# Patient Record
Sex: FEMALE | Race: WHITE | URBAN - METROPOLITAN AREA
[De-identification: names, ages, dates, MRNs, and addresses within clinical notes are randomized per-mention and may not be internally consistent; named-entity substitution may affect disease eponyms.]

---

## 2022-07-28 ENCOUNTER — OFFICE VISIT (OUTPATIENT)
Dept: URBAN - METROPOLITAN AREA CLINIC 72 | Facility: CLINIC | Age: 82
End: 2022-07-28
Payer: COMMERCIAL

## 2022-07-28 DIAGNOSIS — H25.21 AGE-RELATED HYPERMATURE CATARACT OF RIGHT EYE: ICD-10-CM

## 2022-07-28 DIAGNOSIS — H25.812 COMBINED FORMS OF AGE-RELATED CATARACT, LEFT EYE: ICD-10-CM

## 2022-07-28 DIAGNOSIS — H40.053 OCULAR HYPERTENSION, BILATERAL: Primary | ICD-10-CM

## 2022-07-28 PROCEDURE — 76514 ECHO EXAM OF EYE THICKNESS: CPT | Performed by: OPTOMETRIST

## 2022-07-28 PROCEDURE — 92133 CPTRZD OPH DX IMG PST SGM ON: CPT | Performed by: OPTOMETRIST

## 2022-07-28 PROCEDURE — 99204 OFFICE O/P NEW MOD 45 MIN: CPT | Performed by: OPTOMETRIST

## 2022-07-28 ASSESSMENT — VISUAL ACUITY
OD: HM
OS: 20/80

## 2022-07-28 ASSESSMENT — INTRAOCULAR PRESSURE
OD: 30
OS: 25

## 2022-07-28 NOTE — IMPRESSION/PLAN
Impression: Ocular hypertension, bilateral: H40.053. IOP 30 25  thick OS unable OD 
OCT ordered and done today 93 WNL OS Plan: Discussed DX and testing in detail with pt 
IOP is hypertensive, but ON is healthy and OCT is WNL. Do not recommend gtt therapy at this time. Will reevaluate after pt has cataract SX> Pt understands

## 2022-07-28 NOTE — IMPRESSION/PLAN
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 Plan: Discussed recommendation for cataract extraction w/ IOL due to current BCVA and glare. Pt's symptoms are affecting daily life at distance and near. Discussed options for surgery. Recommend OD first, then OS. Patient elects to move forward with surgery. Full discussion R, B, A. Discussed IOL options. All questions answered. Patient wants surgery. RL 2. Schedule for preop.  

Recommend Standard IOL/Toric IOL/Multifocal IOL - confirm with surgeon

## 2022-09-15 ENCOUNTER — PRE-OPERATIVE VISIT (OUTPATIENT)
Dept: URBAN - METROPOLITAN AREA CLINIC 71 | Facility: CLINIC | Age: 82
End: 2022-09-15
Payer: COMMERCIAL

## 2022-09-15 DIAGNOSIS — H25.813 COMBINED FORMS OF AGE-RELATED CATARACT, BILATERAL: ICD-10-CM

## 2022-09-15 DIAGNOSIS — H25.812 COMBINED FORMS OF AGE-RELATED CATARACT, LEFT EYE: Primary | ICD-10-CM

## 2022-09-29 ENCOUNTER — PRE-OPERATIVE VISIT (OUTPATIENT)
Dept: URBAN - METROPOLITAN AREA CLINIC 71 | Facility: CLINIC | Age: 82
End: 2022-09-29
Payer: COMMERCIAL

## 2022-09-29 DIAGNOSIS — H40.053 OCULAR HYPERTENSION, BILATERAL: Primary | ICD-10-CM

## 2022-09-29 DIAGNOSIS — H25.813 COMBINED FORMS OF AGE-RELATED CATARACT, BILATERAL: ICD-10-CM

## 2022-09-29 PROCEDURE — 99213 OFFICE O/P EST LOW 20 MIN: CPT | Performed by: OPHTHALMOLOGY

## 2022-09-29 RX ORDER — KETOROLAC TROMETHAMINE 5 MG/ML
0.5 % SOLUTION OPHTHALMIC
Qty: 5 | Refills: 0 | Status: ACTIVE
Start: 2022-09-29

## 2022-09-29 ASSESSMENT — INTRAOCULAR PRESSURE
OD: 30
OS: 27

## 2022-09-29 NOTE — IMPRESSION/PLAN
Impression: Ocular hypertension, bilateral: H40.053. Plan: Optos ordered- will continue to monitor patient off of drops at this time. Will have them reevaluated after surgery.

## 2022-09-29 NOTE — IMPRESSION/PLAN
Impression: Combined forms of age-related cataract, bilateral: H25.813. Plan: Heart and lungs clear. R/B/A discussed. Proceed with [facility] with ORA cataract surgery with dexycu. OD first for distance then OS for distance. Patient voices understanding that cataract surgery is not a guarantee for 20/20 vision and that glasses may be needed after the surgery. No clearance needed per Dr. Daina Mcintosh. Start ketorolac BID for 3 weeks starting the day prior to surgery on the surgical eye. Proceed with surgery.

## 2022-10-11 ENCOUNTER — SURGERY (OUTPATIENT)
Dept: URBAN - METROPOLITAN AREA SURGERY 45 | Facility: SURGERY | Age: 82
End: 2022-10-11
Payer: COMMERCIAL

## 2022-10-11 ENCOUNTER — SURGERY (OUTPATIENT)
Dept: URBAN - METROPOLITAN AREA SURGERY 44 | Facility: SURGERY | Age: 82
End: 2022-10-11
Payer: COMMERCIAL

## 2022-10-11 PROCEDURE — 66850 REMOVAL OF LENS MATERIAL: CPT | Performed by: OPHTHALMOLOGY

## 2022-10-11 PROCEDURE — CRBAL CREDIT BALANCE: CUSTOM | Performed by: OPHTHALMOLOGY

## 2022-10-12 ENCOUNTER — POST-OPERATIVE VISIT (OUTPATIENT)
Dept: URBAN - METROPOLITAN AREA CLINIC 71 | Facility: CLINIC | Age: 82
End: 2022-10-12
Payer: COMMERCIAL

## 2022-10-12 DIAGNOSIS — Z48.810 ENCOUNTER FOR SURGICAL AFTERCARE FOLLOWING SURGERY ON A SENSE ORGAN: Primary | ICD-10-CM

## 2022-10-12 PROCEDURE — 99024 POSTOP FOLLOW-UP VISIT: CPT | Performed by: OPHTHALMOLOGY

## 2022-10-12 ASSESSMENT — INTRAOCULAR PRESSURE
OD: 40
OS: 30
OD: 43

## 2022-10-12 NOTE — IMPRESSION/PLAN
Impression: S/P Complex Cataract Extraction by phacoemulsification with IOL placement OD - 1 Day. Encounter for surgical aftercare following surgery on a sense organ  Z48.810. Excellent post op course   Post operative instructions reviewed - Plan: Continue the rx eye drops as scheduled. Patient needs to  the pred. and start taking it. IOP 43 OD, will add some lumigan, for her to take at bed time in the right eye for now.

## 2022-10-17 ENCOUNTER — POST-OPERATIVE VISIT (OUTPATIENT)
Dept: URBAN - METROPOLITAN AREA CLINIC 71 | Facility: CLINIC | Age: 82
End: 2022-10-17
Payer: COMMERCIAL

## 2022-10-17 DIAGNOSIS — Z48.810 ENCOUNTER FOR SURGICAL AFTERCARE FOLLOWING SURGERY ON A SENSE ORGAN: Primary | ICD-10-CM

## 2022-10-17 PROCEDURE — 99024 POSTOP FOLLOW-UP VISIT: CPT | Performed by: OPHTHALMOLOGY

## 2022-10-17 ASSESSMENT — INTRAOCULAR PRESSURE
OS: 27
OD: 36

## 2022-10-17 NOTE — IMPRESSION/PLAN
Impression: S/P Complex Cataract Extraction by phacoemulsification with IOL placement OD - 6 Days. Encounter for surgical aftercare following surgery on a sense organ  Z48.810. Plan: Continue brimonidine BID OU, ketorolac BID OD, timolol BID OU, pred TID OD. Will add another drop of vyzulta for the patient to take at bed time. Patient to return in 1 week, explained that we need to see her once a week until her IOP comes down. Gave a list to patient for instructions or drops.

## 2022-10-26 ENCOUNTER — POST-OPERATIVE VISIT (OUTPATIENT)
Dept: URBAN - METROPOLITAN AREA CLINIC 71 | Facility: CLINIC | Age: 82
End: 2022-10-26
Payer: COMMERCIAL

## 2022-10-26 DIAGNOSIS — Z48.810 ENCOUNTER FOR SURGICAL AFTERCARE FOLLOWING SURGERY ON A SENSE ORGAN: Primary | ICD-10-CM

## 2022-10-26 PROCEDURE — 99024 POSTOP FOLLOW-UP VISIT: CPT | Performed by: OPHTHALMOLOGY

## 2022-10-26 ASSESSMENT — INTRAOCULAR PRESSURE
OS: 22
OD: 25

## 2022-10-26 NOTE — IMPRESSION/PLAN
Impression: S/P Complex Cataract Extraction by phacoemulsification with IOL placement OD - 15 Days. Encounter for surgical aftercare following surgery on a sense organ  Z48.810. Plan: With a +10 sph she was up to 20/60. Patient doesn't seem compliant on drops, she had stopped taking the Vyzulta. Discussed options, from glasses to CL which would be thick or a sutured implant in OD. Recommend since the capsule strong enough using the December appointment to put another implant in the right eye instead of the left. Decrease the pred BID OD, brimonidine BID OU, timolol OU, ketorolac BID OD.  Patient to return 4 weeks to r/c

## 2022-11-29 ENCOUNTER — POST-OPERATIVE VISIT (OUTPATIENT)
Dept: URBAN - METROPOLITAN AREA CLINIC 71 | Facility: CLINIC | Age: 82
End: 2022-11-29
Payer: COMMERCIAL

## 2022-11-29 DIAGNOSIS — Z48.810 ENCOUNTER FOR SURGICAL AFTERCARE FOLLOWING SURGERY ON A SENSE ORGAN: Primary | ICD-10-CM

## 2022-11-29 PROCEDURE — 99024 POSTOP FOLLOW-UP VISIT: CPT | Performed by: OPHTHALMOLOGY

## 2022-11-29 ASSESSMENT — INTRAOCULAR PRESSURE
OD: 35
OS: 28

## 2022-11-29 ASSESSMENT — KERATOMETRY
OS: 44.38
OD: 44.00

## 2022-11-29 NOTE — IMPRESSION/PLAN
Impression: S/P Complex Cataract Extraction by phacoemulsification with IOL placement OD - 49 Days. Encounter for surgical aftercare following surgery on a sense organ  Z48.810. Post operative instructions reviewed - Plan: Continue ketorolac BID OD for 3 weeks. Patients IOP is elevated OU. Glaucoma- patient IOP is uncontrolled mainly OD, unfortunately she is intolerant to sulfa, beta blockers or pro Will try rhopressa but seems surgical treatment of the glaucoma seems to be the key. PCIOL: There seems to be no zonules nasally , suspect a PCO sutured implant will be the best option. Will refer to Dr Dilia Marsh for a second opinion. Start Rhopressa QHS OD, continue brimonidine BID OD.

## 2022-12-12 ENCOUNTER — POST-OPERATIVE VISIT (OUTPATIENT)
Dept: URBAN - METROPOLITAN AREA CLINIC 71 | Facility: CLINIC | Age: 82
End: 2022-12-12
Payer: COMMERCIAL

## 2022-12-12 DIAGNOSIS — Z48.810 ENCOUNTER FOR SURGICAL AFTERCARE FOLLOWING SURGERY ON A SENSE ORGAN: Primary | ICD-10-CM

## 2022-12-12 PROCEDURE — 99024 POSTOP FOLLOW-UP VISIT: CPT | Performed by: OPHTHALMOLOGY

## 2022-12-12 RX ORDER — LATANOPROSTENE BUNOD 0.24 MG/ML
0.024 % SOLUTION/ DROPS OPHTHALMIC
Qty: 5 | Refills: 0 | Status: ACTIVE
Start: 2022-12-12

## 2022-12-12 RX ORDER — BRIMONIDINE TARTRATE 2 MG/ML
0.2 % SOLUTION/ DROPS OPHTHALMIC
Qty: 5 | Refills: 0 | Status: INACTIVE
Start: 2022-12-12 | End: 2022-12-13

## 2022-12-12 ASSESSMENT — INTRAOCULAR PRESSURE
OS: 19
OD: 19

## 2022-12-12 NOTE — IMPRESSION/PLAN
Impression: S/P Complex Cataract Extraction by phacoemulsification with IOL placement OD - 62 Days. Encounter for surgical aftercare following surgery on a sense organ  Z48.810. Plan: Healing well. IOP stable, patient had not started the rhopressa. Continue brimonidine BID OU, Vyzulta QHS OU. Patient to return n/a with Dr Anil Schrader for a second opinion for a suture IOL to make sure it can be more stable, then see if he can do the suture IOL for the left eye. Will hold off on the 20th until Dr Anil Schrader.

## 2022-12-22 ENCOUNTER — OFFICE VISIT (OUTPATIENT)
Facility: LOCATION | Age: 82
End: 2022-12-22
Payer: COMMERCIAL

## 2022-12-22 DIAGNOSIS — H27.01 APHAKIA, RIGHT EYE: Primary | ICD-10-CM

## 2022-12-22 DIAGNOSIS — H25.12 AGE-RELATED NUCLEAR CATARACT, LEFT EYE: ICD-10-CM

## 2022-12-22 DIAGNOSIS — H35.3133 NONEXUDATIVE AGE-RELATED MACULAR DEGENERATION, BILATERAL, ADVANCED ATROPHIC WITHOUT SUBFOVEAL INVOLVEMENT: ICD-10-CM

## 2022-12-22 DIAGNOSIS — H43.811 VITREOUS DEGENERATION, RIGHT EYE: ICD-10-CM

## 2022-12-22 PROCEDURE — 99203 OFFICE O/P NEW LOW 30 MIN: CPT | Performed by: OPHTHALMOLOGY

## 2022-12-22 PROCEDURE — 92134 CPTRZ OPH DX IMG PST SGM RTA: CPT | Performed by: OPHTHALMOLOGY

## 2022-12-22 ASSESSMENT — INTRAOCULAR PRESSURE
OS: 21
OD: 21

## 2022-12-22 NOTE — IMPRESSION/PLAN
Impression: Aphakia, right eye: H27.01. Plan: We discussed the findings of the patient's aphakia. RBAC's of contact lens vs ACIOL vs posterior fixated lens d/w patient. Surgical treatment is recommended. Surgical risks, benefits, indications and alternatives were discussed. These risks include, but are not limited to, infection, retinal tear/detachment, dislocation of the secondary lens, glaucoma, pain, loss of eye and blindness, hemorrhage, ptosis, and the need for re-operation. The patient elects to proceed with vitrectomy surgery/sutured lens vs ACIOL 25g PPV/sutured IOL vs ACIOL OD

## 2022-12-22 NOTE — IMPRESSION/PLAN
Impression: Nonexudative age-related macular degeneration, bilateral, advanced atrophic without subfoveal involvement: H35.3133. OCT OU - DS OU  Plan: Will limit VA outcome with surgery Discussed diagnosis with patient. No smoking. Pt to take vitamins approved in the AREDS2 study & continue to monitor AG on a daily basis. If any changes w/ AG call and RTC ASAP. May benefit from treatment of atrophy with future agents, once FDA-approved and available. D/w patient.

## 2023-03-09 ENCOUNTER — POST-OPERATIVE VISIT (OUTPATIENT)
Facility: LOCATION | Age: 83
End: 2023-03-09
Payer: COMMERCIAL

## 2023-03-09 DIAGNOSIS — H27.01 APHAKIA, RIGHT EYE: Primary | ICD-10-CM

## 2023-03-09 PROCEDURE — 99024 POSTOP FOLLOW-UP VISIT: CPT | Performed by: OPHTHALMOLOGY

## 2023-03-09 ASSESSMENT — INTRAOCULAR PRESSURE
OD: 20
OS: 24

## 2023-03-09 NOTE — IMPRESSION/PLAN
Impression: S/P 25g PPV/sutured IOL vs ACIOL OD - 1 Day. Aphakia, right eye  H27.01. Plan: No s/s of RD/infection VA/IOP acceptable Sutured IOL centered and stable Post-operative instructions and precautions Reviewed. 
Call ASAP with changes
--Continue Ocuflox QID--Continue PF QID

1 week POS

## 2023-03-21 ENCOUNTER — POST-OPERATIVE VISIT (OUTPATIENT)
Facility: LOCATION | Age: 83
End: 2023-03-21
Payer: COMMERCIAL

## 2023-03-21 DIAGNOSIS — H27.01 APHAKIA, RIGHT EYE: Primary | ICD-10-CM

## 2023-03-21 PROCEDURE — 99024 POSTOP FOLLOW-UP VISIT: CPT | Performed by: OPHTHALMOLOGY

## 2023-03-21 RX ORDER — BIMATOPROST 0.3 MG/ML
0.03 % SOLUTION/ DROPS OPHTHALMIC
Qty: 5 | Refills: 3 | Status: INACTIVE
Start: 2023-03-21 | End: 2023-04-19

## 2023-03-21 ASSESSMENT — INTRAOCULAR PRESSURE
OD: 44
OS: 35

## 2023-03-21 NOTE — IMPRESSION/PLAN
Impression: S/P 25g PPV/sutured IOL OD - 13 Days. Aphakia, right eye  H27.01. Plan: No s/s of RD/infection VA  acceptable Sutured IOL well centered and stable IOP elevated - has had episodes of bradycardia on her IOP meds that she uses in both eyes - she stopped using all drops except for brimonidine - not taking ABX or PF either D/w patient that if her IOP remains elevated OU that she is at risk of permanently losing vision in both eyes Rec resume IOP drops, but avoid beta-blockers D/w patient at length - rec start lumigan OU QHS Post-operative instructions and precautions Reviewed. Call ASAP with changes 2 week IOP check with Dr. Molly Joseph 1 month POS/OCT

## 2023-03-28 ENCOUNTER — POST-OPERATIVE VISIT (OUTPATIENT)
Dept: URBAN - METROPOLITAN AREA CLINIC 71 | Facility: CLINIC | Age: 83
End: 2023-03-28
Payer: COMMERCIAL

## 2023-03-28 DIAGNOSIS — Z48.810 ENCOUNTER FOR SURGICAL AFTERCARE FOLLOWING SURGERY ON A SENSE ORGAN: Primary | ICD-10-CM

## 2023-03-28 PROCEDURE — 99024 POSTOP FOLLOW-UP VISIT: CPT | Performed by: OPTOMETRIST

## 2023-03-28 RX ORDER — BIMATOPROST 0.1 MG/ML
0.01 % SOLUTION/ DROPS OPHTHALMIC
Qty: 1 | Refills: 3 | Status: ACTIVE
Start: 2023-03-28

## 2023-03-28 RX ORDER — BIMATOPROST 0.1 MG/ML
0.01 % SOLUTION/ DROPS OPHTHALMIC
Qty: 1 | Refills: 3 | Status: INACTIVE
Start: 2023-03-28 | End: 2023-03-28

## 2023-03-28 ASSESSMENT — INTRAOCULAR PRESSURE
OS: 24
OD: 31

## 2023-03-28 NOTE — IMPRESSION/PLAN
Impression: S/P 25g PPV/sutured IOL vs ACIOL OD - 20 Days. Encounter for surgical aftercare following surgery on a sense organ  Z48.810. PAtient had not started Lumigan yet. Was given a sample bottle in office today and told to take it once at night before bed OU. Continue taking Brimonidine TID OU Pressure still high at 31/24 Plan: IOP/drop check in two weeks

## 2023-04-12 ENCOUNTER — OFFICE VISIT (OUTPATIENT)
Dept: URBAN - METROPOLITAN AREA CLINIC 71 | Facility: CLINIC | Age: 83
End: 2023-04-12
Payer: COMMERCIAL

## 2023-04-12 DIAGNOSIS — Z48.810 ENCOUNTER FOR SURGICAL AFTERCARE FOLLOWING SURGERY ON A SENSE ORGAN: Primary | ICD-10-CM

## 2023-04-12 PROCEDURE — 99212 OFFICE O/P EST SF 10 MIN: CPT | Performed by: OPTOMETRIST

## 2023-04-12 ASSESSMENT — INTRAOCULAR PRESSURE
OD: 21
OS: 19

## 2023-04-27 ENCOUNTER — POST-OPERATIVE VISIT (OUTPATIENT)
Facility: LOCATION | Age: 83
End: 2023-04-27
Payer: COMMERCIAL

## 2023-04-27 DIAGNOSIS — H27.01 APHAKIA, RIGHT EYE: Primary | ICD-10-CM

## 2023-04-27 PROCEDURE — 99024 POSTOP FOLLOW-UP VISIT: CPT | Performed by: OPHTHALMOLOGY

## 2023-04-27 ASSESSMENT — INTRAOCULAR PRESSURE
OS: 28
OD: 27

## 2023-04-27 NOTE — IMPRESSION/PLAN
Impression: S/P 25g PPV/sutured IOL vs ACIOL OD - 50 Days. Aphakia, right eye  H27.01.
OCT OU - no IRF/SRF OU  / 220  Plan: No s/s of RD/infection VA/IOP acceptable Sutured IOL stable and centered Post-operative instructions and precautions Reviewed. Taper PF TID x 1 week, BID x 1 week, daily x 1 week and CSM with daily Call ASAP with changes f/u with Dr. Rahul Renae next week as scheduled

prn RCA

## 2023-05-01 ENCOUNTER — OFFICE VISIT (OUTPATIENT)
Dept: URBAN - METROPOLITAN AREA CLINIC 71 | Facility: CLINIC | Age: 83
End: 2023-05-01
Payer: COMMERCIAL

## 2023-05-01 DIAGNOSIS — H35.3133 NONEXUDATIVE AGE-RELATED MACULAR DEGENERATION, BILATERAL, ADVANCED ATROPHIC WITHOUT SUBFOVEAL INVOLVEMENT: ICD-10-CM

## 2023-05-01 DIAGNOSIS — H25.12 AGE-RELATED NUCLEAR CATARACT, LEFT EYE: ICD-10-CM

## 2023-05-01 DIAGNOSIS — Z96.1 PRESENCE OF INTRAOCULAR LENS: Primary | ICD-10-CM

## 2023-05-01 DIAGNOSIS — H43.811 VITREOUS DEGENERATION, RIGHT EYE: ICD-10-CM

## 2023-05-01 DIAGNOSIS — H40.053 OCULAR HYPERTENSION, BILATERAL: ICD-10-CM

## 2023-05-01 PROCEDURE — 99024 POSTOP FOLLOW-UP VISIT: CPT | Performed by: OPHTHALMOLOGY

## 2023-05-01 RX ORDER — BRIMONIDINE TARTRATE 2 MG/ML
0.2 % SOLUTION/ DROPS OPHTHALMIC
Qty: 10 | Refills: 1 | Status: ACTIVE
Start: 2023-05-01

## 2023-05-01 ASSESSMENT — INTRAOCULAR PRESSURE
OS: 22
OD: 23

## 2023-05-01 ASSESSMENT — VISUAL ACUITY
OD: 20/50
OS: 20/30

## 2023-05-01 NOTE — IMPRESSION/PLAN
Impression: Nonexudative age-related macular degeneration, bilateral, advanced atrophic without subfoveal involvement: H35.9393. Plan: Continue visits with Sridhar.

## 2023-05-01 NOTE — IMPRESSION/PLAN
Impression: Presence of intraocular lens: Z96.1. Right. Sutured IOL by Dr Georgette Valencia. Plan: OD: +1.00 -2.00 101 20/50+2. OS: +0.25 -4.00 088 20/30. Discussed with patient, now that the implant is sutured she is ok to proceed with the left eye as scheduled.

## 2023-06-23 ENCOUNTER — PRE-OPERATIVE VISIT (OUTPATIENT)
Dept: URBAN - METROPOLITAN AREA CLINIC 71 | Facility: CLINIC | Age: 83
End: 2023-06-23
Payer: COMMERCIAL

## 2023-06-23 DIAGNOSIS — H25.12 AGE-RELATED NUCLEAR CATARACT, LEFT EYE: Primary | ICD-10-CM

## 2023-07-12 ENCOUNTER — PRE-OPERATIVE VISIT (OUTPATIENT)
Dept: URBAN - METROPOLITAN AREA CLINIC 71 | Facility: CLINIC | Age: 83
End: 2023-07-12
Payer: COMMERCIAL

## 2023-07-12 DIAGNOSIS — H25.12 AGE-RELATED NUCLEAR CATARACT, LEFT EYE: ICD-10-CM

## 2023-07-12 DIAGNOSIS — H35.3133 NONEXUDATIVE AGE-RELATED MACULAR DEGENERATION, BILATERAL, ADVANCED ATROPHIC WITHOUT SUBFOVEAL INVOLVEMENT: ICD-10-CM

## 2023-07-12 DIAGNOSIS — H25.812 COMBINED FORMS OF AGE-RELATED CATARACT, LEFT EYE: Primary | ICD-10-CM

## 2023-07-12 PROCEDURE — 92136 OPHTHALMIC BIOMETRY: CPT | Performed by: OPHTHALMOLOGY

## 2023-07-12 PROCEDURE — 99213 OFFICE O/P EST LOW 20 MIN: CPT | Performed by: OPHTHALMOLOGY

## 2023-07-12 RX ORDER — KETOROLAC TROMETHAMINE 5 MG/ML
0.5 % SOLUTION OPHTHALMIC
Qty: 5 | Refills: 0 | Status: ACTIVE
Start: 2023-07-12

## 2023-07-12 ASSESSMENT — KERATOMETRY
OD: 44.50
OS: 44.63

## 2023-07-12 ASSESSMENT — VISUAL ACUITY
OS: 20/30
OD: 20/50

## 2023-07-12 ASSESSMENT — INTRAOCULAR PRESSURE
OS: 18
OD: 14

## 2023-07-12 NOTE — IMPRESSION/PLAN
Impression: Combined forms of age-related cataract, left eye: H25.812. Plan: Alvaro Borrero, OCT & Optos ordered and reviewed. Heart and lungs clear. R/B/A discussed. Proceed with Traditional cataract surgery with dextenza. OS only for distance. Patient voices understanding that cataract surgery is not a guarantee for 20/20 vision and that glasses may be needed after the surgery. Patient declines ORA. No clearance needed per Dr. Madison Cee. Start ketorolac BID for 3 weeks starting the day prior to surgery on the surgical eye.

## 2023-07-12 NOTE — IMPRESSION/PLAN
Impression: Nonexudative age-related macular degeneration, bilateral, advanced atrophic without subfoveal involvement: H35.3133. Plan: Optos ordered, no sign of any fluid noted.

## 2023-07-18 ENCOUNTER — SURGERY (OUTPATIENT)
Dept: URBAN - METROPOLITAN AREA SURGERY 44 | Facility: SURGERY | Age: 83
End: 2023-07-18
Payer: COMMERCIAL

## 2023-07-18 ENCOUNTER — SURGERY (OUTPATIENT)
Dept: URBAN - METROPOLITAN AREA SURGERY 45 | Facility: SURGERY | Age: 83
End: 2023-07-18
Payer: COMMERCIAL

## 2023-07-18 PROCEDURE — 66984 XCAPSL CTRC RMVL W/O ECP: CPT | Performed by: OPHTHALMOLOGY

## 2023-07-19 ENCOUNTER — POST-OPERATIVE VISIT (OUTPATIENT)
Dept: URBAN - METROPOLITAN AREA CLINIC 71 | Facility: CLINIC | Age: 83
End: 2023-07-19
Payer: COMMERCIAL

## 2023-07-19 DIAGNOSIS — Z96.1 PRESENCE OF INTRAOCULAR LENS: Primary | ICD-10-CM

## 2023-07-19 PROCEDURE — 99024 POSTOP FOLLOW-UP VISIT: CPT | Performed by: OPTOMETRIST

## 2023-07-19 ASSESSMENT — INTRAOCULAR PRESSURE
OD: 19
OS: 28

## 2023-07-19 NOTE — IMPRESSION/PLAN
Impression: S/P Cataract Extraction by phacoemulsification with IOL placement OS - 1 Day. Presence of intraocular lens  Z96.1. PO instructions reviewed in detail with patient as well as all questions and concerns answered. Plan: Patient to continue ketorolac BID OS as well as lubricating drops 5 mins apart. Patient advised to RTC immediately if any pain or tenderness occurs as this may be a sign of infection.

## 2023-08-16 ENCOUNTER — POST-OPERATIVE VISIT (OUTPATIENT)
Dept: URBAN - METROPOLITAN AREA CLINIC 71 | Facility: CLINIC | Age: 83
End: 2023-08-16
Payer: COMMERCIAL

## 2023-08-16 DIAGNOSIS — H52.223 REGULAR ASTIGMATISM, BILATERAL: ICD-10-CM

## 2023-08-16 DIAGNOSIS — Z96.1 PRESENCE OF INTRAOCULAR LENS: Primary | ICD-10-CM

## 2023-08-16 PROCEDURE — 99024 POSTOP FOLLOW-UP VISIT: CPT | Performed by: OPTOMETRIST

## 2023-08-16 ASSESSMENT — VISUAL ACUITY
OD: 20/100
OS: 20/50

## 2023-08-16 ASSESSMENT — INTRAOCULAR PRESSURE
OD: 18
OS: 23

## 2024-02-20 ENCOUNTER — OFFICE VISIT (OUTPATIENT)
Dept: URBAN - METROPOLITAN AREA CLINIC 71 | Facility: CLINIC | Age: 84
End: 2024-02-20
Payer: COMMERCIAL

## 2024-02-20 DIAGNOSIS — H35.3133 NONEXUDATIVE AGE-RELATED MACULAR DEGENERATION, BILATERAL, ADVANCED ATROPHIC WITHOUT SUBFOVEAL INVOLVEMENT: Primary | ICD-10-CM

## 2024-02-20 DIAGNOSIS — H40.053 OCULAR HYPERTENSION, BILATERAL: ICD-10-CM

## 2024-02-20 DIAGNOSIS — Z96.1 PRESENCE OF INTRAOCULAR LENS: ICD-10-CM

## 2024-02-20 PROCEDURE — 99213 OFFICE O/P EST LOW 20 MIN: CPT | Performed by: OPTOMETRIST

## 2024-02-20 PROCEDURE — 92133 CPTRZD OPH DX IMG PST SGM ON: CPT | Performed by: OPTOMETRIST

## 2024-02-20 PROCEDURE — 92134 CPTRZ OPH DX IMG PST SGM RTA: CPT | Performed by: OPTOMETRIST

## 2024-02-20 ASSESSMENT — INTRAOCULAR PRESSURE
OD: 21
OS: 17

## 2025-03-05 ENCOUNTER — OFFICE VISIT (OUTPATIENT)
Dept: URBAN - METROPOLITAN AREA CLINIC 71 | Facility: CLINIC | Age: 85
End: 2025-03-05
Payer: COMMERCIAL

## 2025-03-05 DIAGNOSIS — H40.053 OCULAR HYPERTENSION, BILATERAL: ICD-10-CM

## 2025-03-05 DIAGNOSIS — H35.3133 NONEXUDATIVE AGE-RELATED MACULAR DEGENERATION, BILATERAL, ADVANCED ATROPHIC WITHOUT SUBFOVEAL INVOLVEMENT: Primary | ICD-10-CM

## 2025-03-05 DIAGNOSIS — Z96.1 PRESENCE OF INTRAOCULAR LENS: ICD-10-CM

## 2025-03-05 ASSESSMENT — INTRAOCULAR PRESSURE
OS: 15
OD: 22